# Patient Record
Sex: MALE | Race: BLACK OR AFRICAN AMERICAN | NOT HISPANIC OR LATINO | URBAN - METROPOLITAN AREA
[De-identification: names, ages, dates, MRNs, and addresses within clinical notes are randomized per-mention and may not be internally consistent; named-entity substitution may affect disease eponyms.]

---

## 2019-06-03 ENCOUNTER — EMERGENCY (EMERGENCY)
Facility: HOSPITAL | Age: 24
LOS: 1 days | Discharge: HOME | End: 2019-06-03
Admitting: EMERGENCY MEDICINE
Payer: COMMERCIAL

## 2019-06-03 VITALS
RESPIRATION RATE: 16 BRPM | OXYGEN SATURATION: 99 % | TEMPERATURE: 98 F | HEART RATE: 52 BPM | SYSTOLIC BLOOD PRESSURE: 138 MMHG | DIASTOLIC BLOOD PRESSURE: 69 MMHG

## 2019-06-03 PROCEDURE — 72083 X-RAY EXAM ENTIRE SPI 4/5 VW: CPT | Mod: 26

## 2019-06-03 PROCEDURE — 99283 EMERGENCY DEPT VISIT LOW MDM: CPT

## 2019-06-03 RX ORDER — IBUPROFEN 200 MG
600 TABLET ORAL ONCE
Refills: 0 | Status: COMPLETED | OUTPATIENT
Start: 2019-06-03 | End: 2019-06-03

## 2019-06-03 RX ORDER — IBUPROFEN 200 MG
1 TABLET ORAL
Qty: 20 | Refills: 0
Start: 2019-06-03

## 2019-06-03 RX ORDER — ACETAMINOPHEN 500 MG
975 TABLET ORAL ONCE
Refills: 0 | Status: COMPLETED | OUTPATIENT
Start: 2019-06-03 | End: 2019-06-03

## 2019-06-03 RX ADMIN — Medication 600 MILLIGRAM(S): at 14:03

## 2019-06-03 RX ADMIN — Medication 600 MILLIGRAM(S): at 15:12

## 2019-06-03 RX ADMIN — Medication 975 MILLIGRAM(S): at 14:00

## 2019-06-03 RX ADMIN — Medication 975 MILLIGRAM(S): at 15:12

## 2019-06-03 NOTE — ED PROVIDER NOTE - CHPI ED SYMPTOMS POS
neck pain, BL shoulder pain, low back pain radiating down BL inner thighs, intermittent numbness in medial thighs, possible LOC/PAIN

## 2019-06-03 NOTE — ED PROVIDER NOTE - MUSCULOSKELETAL [+], MLM
BACK PAIN/neck pain, BL shoulder pain, low back pain radiating down BL inner thighs, generalized weakness/JOINT PAIN
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
## 2019-06-03 NOTE — ED PROVIDER NOTE - NSFOLLOWUPINSTRUCTIONS_ED_ALL_ED_FT
Take motrin 600mg every 8 hours as needed for pain.  Follow up with an orthopedic within 1-2 weeks.  Return to ED for any worsening pain, weakness or numbness.

## 2019-06-03 NOTE — ED PROVIDER NOTE - MUSCULOSKELETAL NECK EXAM
paraspinal muscle tenderness of his neck, FROM of neck, BL trapezius tenderness, no midline c-spine tenderness
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
## 2020-09-27 ENCOUNTER — EMERGENCY (EMERGENCY)
Facility: HOSPITAL | Age: 25
LOS: 1 days | Discharge: HOME | End: 2020-09-27
Attending: EMERGENCY MEDICINE
Payer: COMMERCIAL

## 2020-09-27 VITALS
SYSTOLIC BLOOD PRESSURE: 147 MMHG | DIASTOLIC BLOOD PRESSURE: 81 MMHG | WEIGHT: 199.96 LBS | TEMPERATURE: 99 F | OXYGEN SATURATION: 100 % | HEART RATE: 66 BPM | HEIGHT: 78 IN | RESPIRATION RATE: 18 BRPM

## 2020-09-27 PROCEDURE — 73080 X-RAY EXAM OF ELBOW: CPT

## 2020-09-27 PROCEDURE — 99285 EMERGENCY DEPT VISIT HI MDM: CPT

## 2020-09-27 PROCEDURE — 70450 CT HEAD/BRAIN W/O DYE: CPT | Mod: 26

## 2020-09-27 PROCEDURE — 73030 X-RAY EXAM OF SHOULDER: CPT | Mod: 26,LT

## 2020-09-27 PROCEDURE — 73010 X-RAY EXAM OF SHOULDER BLADE: CPT

## 2020-09-27 PROCEDURE — 72125 CT NECK SPINE W/O DYE: CPT | Mod: 26

## 2020-09-27 PROCEDURE — 73080 X-RAY EXAM OF ELBOW: CPT | Mod: 26,LT

## 2020-09-27 PROCEDURE — 72125 CT NECK SPINE W/O DYE: CPT

## 2020-09-27 PROCEDURE — 99284 EMERGENCY DEPT VISIT MOD MDM: CPT | Mod: 25

## 2020-09-27 PROCEDURE — 73030 X-RAY EXAM OF SHOULDER: CPT

## 2020-09-27 PROCEDURE — 70450 CT HEAD/BRAIN W/O DYE: CPT

## 2020-09-27 PROCEDURE — 73010 X-RAY EXAM OF SHOULDER BLADE: CPT | Mod: 26
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
## 2020-09-27 NOTE — ED ADULT NURSE NOTE - NSIMPLEMENTINTERV_GEN_ALL_ED
Implemented All Universal Safety Interventions:  [place] to call system. Call bell, personal items and telephone within reach. Instruct patient to call for assistance. Room bathroom lighting operational. Non-slip footwear when patient is off stretcher. Physically safe environment: no spills, clutter or unnecessary equipment. Stretcher in lowest position, wheels locked, appropriate side rails in place.

## 2020-09-27 NOTE — ED ADULT NURSE NOTE - CAS ELECT INFOMATION PROVIDED
DC instructions/Follow up with PMD and orthopedist. Return for any worsening. Take Tylenol or Motrin for pain.

## 2020-09-27 NOTE — ED ADULT NURSE REASSESSMENT NOTE - NS ED NURSE REASSESS COMMENT FT1
Received report from Iliana MARTINEZ. Patient currently at CT scan, will assess patient when they return to room.

## 2020-09-27 NOTE — ED PROVIDER NOTE - PATIENT PORTAL LINK FT
You can access the FollowMyHealth Patient Portal offered by [facility] by registering at the following website: [email or web address]. By joining [email or web address]’s FollowMyHealth portal, you will also be able to view your health information using other applications (apps) compatible with our system.
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
## 2020-09-27 NOTE — ED PROVIDER NOTE - NSFOLLOWUPINSTRUCTIONS_ED_ALL_ED_FT
Follow up with your PCP and orthopedist in 24-48 hours.   May take Tylenol and Motrin as directed on the bottle for pain control.   Return to the ER if you develop any new or worsening symptoms such as chest pain, shortness of breath, numbness, weakness, abdominal pain, nausea, vomiting, or visual changes.
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
## 2020-09-28 VITALS
OXYGEN SATURATION: 99 % | SYSTOLIC BLOOD PRESSURE: 122 MMHG | DIASTOLIC BLOOD PRESSURE: 58 MMHG | RESPIRATION RATE: 18 BRPM | HEART RATE: 69 BPM

## 2021-12-14 NOTE — ED PROVIDER NOTE - NS ED ACP SCRIBE NAME FT
RX sent. Left a vm to patient to call back regarding some change with his prescription.    Heath Rosa

## 2023-01-12 NOTE — ED PROVIDER NOTE - NEUROLOGICAL SPEECH
Oculoplastic Surgeon Procedure Text (A): After obtaining clear surgical margins the patient was sent to oculoplastics for surgical repair.  The patient understands they will receive post-surgical care and follow-up from the referring physician's office. clear

## 2024-05-07 NOTE — ED ADULT NURSE NOTE - OBJECTIVE STATEMENT
Occupational Therapy- ICU  Pt requires  max assist with UB dressing and max/total assist for LB dressing to masha/doff depends and total assist for nhung-cares (in standing with STEDY) following bedside commode use. Will cont.with current OT POC.         AM-PAC  - Prior Level of Function: IND/MOD I ([facility] 22-24)       Key: MOD A=moderate assistance, IND/MOD I=independent/modified independent  - Generalized Current Level of Function     - Current Self-Cares: 11       Scoring Key= >21 Modified Independent; 20-21 Supervision; 18-19 Minimal assist; 13-17 Moderate assist; 9-12 Max assist; <9 Total assist      PLAN (while hospitalized)  Suggestions for next session as indicated: ADL session     OT Frequency: 3-5 x per week      PT/OT Mobility Equipment for Discharge: needs 2ww  PT/OT ADL Equipment for Discharge: continue to monitor  Interventions: ADL retraining, upper extremity strengthening/ROM, fine motor coordination activities, energy conservation, safety training, therapeutic exercise, patient education, activity tolerance training, bed mobility training, positioning, IADL, functional transfer training, therapeutic activity and coordination  Agreement to plan and goals: patient agrees with goals and treatment plan      GOALS  Review Date: 5/14/2024  Long Term Goals: (to be met by time of discharge from hospital)  Grooming: Patient will complete grooming tasks at sink set up.  Upper body dressing: Patient will complete upper body dressing at bedside set up.  Lower body dressing: Patient will complete lower body dressing at bedside supervision.  Toileting: Patient will complete toileting supervision.  Bathing: Patient will complete bathing chairset up and supervision Toilet transfer: Patient will complete toilet transfer with least restrictive device and gait belt, supervision.   Home setting transfer: Patient will complete home setting transfers with gait belt and least  restrictive device, supervision.     Documented in the chart in the following areas: Assessment/Plan.    Patient at End of Session:   Location: in chair  Safety measures: alarm system in place/re-engaged, call light within reach and lines intact  Handoff to: nurse      Therapy procedure time and total treatment time can be found documented on the Time Entry flowsheet   pt was the restrained front seat passenger involved in a mvc where the car spun around multiple times.  he arrives with a cervical collar  C/o left shoulder pain and neck pain.  pt had shoulder surgery on the left a year ago.    no s/s deformity